# Patient Record
Sex: FEMALE | Race: WHITE | Employment: FULL TIME | URBAN - METROPOLITAN AREA
[De-identification: names, ages, dates, MRNs, and addresses within clinical notes are randomized per-mention and may not be internally consistent; named-entity substitution may affect disease eponyms.]

---

## 2018-01-18 ENCOUNTER — DOCUMENTATION ONLY (OUTPATIENT)
Dept: SURGERY | Facility: CLINIC | Age: 28
End: 2018-01-18

## 2018-03-09 ENCOUNTER — HOSPITAL ENCOUNTER (OUTPATIENT)
Dept: LAB | Facility: CLINIC | Age: 28
Discharge: HOME | End: 2018-03-09
Attending: PHYSICIAN ASSISTANT | Admitting: PHYSICIAN ASSISTANT
Payer: COMMERCIAL

## 2018-03-09 ENCOUNTER — OFFICE VISIT (OUTPATIENT)
Dept: SURGERY | Facility: CLINIC | Age: 28
End: 2018-03-09
Payer: COMMERCIAL

## 2018-03-09 VITALS
SYSTOLIC BLOOD PRESSURE: 140 MMHG | BODY MASS INDEX: 44.41 KG/M2 | WEIGHT: 293 LBS | HEART RATE: 93 BPM | DIASTOLIC BLOOD PRESSURE: 92 MMHG | HEIGHT: 68 IN

## 2018-03-09 VITALS — WEIGHT: 293 LBS | HEIGHT: 68 IN | BODY MASS INDEX: 44.41 KG/M2

## 2018-03-09 DIAGNOSIS — K91.2 POSTSURGICAL MALABSORPTION: ICD-10-CM

## 2018-03-09 DIAGNOSIS — I10 HYPERTENSION, UNSPECIFIED TYPE: ICD-10-CM

## 2018-03-09 DIAGNOSIS — E66.01 MORBID OBESITY WITH BMI OF 45.0-49.9, ADULT (H): ICD-10-CM

## 2018-03-09 DIAGNOSIS — Z98.84 BARIATRIC SURGERY STATUS: Primary | ICD-10-CM

## 2018-03-09 DIAGNOSIS — Z98.84 BARIATRIC SURGERY STATUS: ICD-10-CM

## 2018-03-09 LAB
ERYTHROCYTE [DISTWIDTH] IN BLOOD BY AUTOMATED COUNT: 12.8 % (ref 10–15)
FERRITIN SERPL-MCNC: 5 NG/ML (ref 12–150)
HCT VFR BLD AUTO: 41.7 % (ref 35–47)
HGB BLD-MCNC: 14.4 G/DL (ref 11.7–15.7)
IRON SATN MFR SERPL: 12 % (ref 15–46)
IRON SERPL-MCNC: 61 UG/DL (ref 35–180)
MCH RBC QN AUTO: 29.8 PG (ref 26.5–33)
MCHC RBC AUTO-ENTMCNC: 34.5 G/DL (ref 31.5–36.5)
MCV RBC AUTO: 86 FL (ref 78–100)
PLATELET # BLD AUTO: 300 10E9/L (ref 150–450)
PTH-INTACT SERPL-MCNC: 68 PG/ML (ref 18–80)
RBC # BLD AUTO: 4.83 10E12/L (ref 3.8–5.2)
TIBC SERPL-MCNC: 497 UG/DL (ref 240–430)
VIT B12 SERPL-MCNC: 161 PG/ML (ref 193–986)
WBC # BLD AUTO: 6.4 10E9/L (ref 4–11)

## 2018-03-09 PROCEDURE — 97803 MED NUTRITION INDIV SUBSEQ: CPT | Performed by: DIETITIAN, REGISTERED

## 2018-03-09 PROCEDURE — 82306 VITAMIN D 25 HYDROXY: CPT | Performed by: PHYSICIAN ASSISTANT

## 2018-03-09 PROCEDURE — 82728 ASSAY OF FERRITIN: CPT | Performed by: PHYSICIAN ASSISTANT

## 2018-03-09 PROCEDURE — 83540 ASSAY OF IRON: CPT | Performed by: PHYSICIAN ASSISTANT

## 2018-03-09 PROCEDURE — 83550 IRON BINDING TEST: CPT | Performed by: PHYSICIAN ASSISTANT

## 2018-03-09 PROCEDURE — 99204 OFFICE O/P NEW MOD 45 MIN: CPT | Performed by: PHYSICIAN ASSISTANT

## 2018-03-09 PROCEDURE — 82607 VITAMIN B-12: CPT | Performed by: PHYSICIAN ASSISTANT

## 2018-03-09 PROCEDURE — 85027 COMPLETE CBC AUTOMATED: CPT | Performed by: PHYSICIAN ASSISTANT

## 2018-03-09 PROCEDURE — 36415 COLL VENOUS BLD VENIPUNCTURE: CPT | Performed by: PHYSICIAN ASSISTANT

## 2018-03-09 PROCEDURE — 83970 ASSAY OF PARATHORMONE: CPT | Performed by: PHYSICIAN ASSISTANT

## 2018-03-09 NOTE — MR AVS SNAPSHOT
After Visit Summary   3/9/2018    Siomara Nugent Mohs    MRN: [id number]           Patient Information     Date Of Birth          1990        Visit Information        Provider Department      3/9/2018 11:30 AM Adeel Bose PA-C [facility]      Today's Diagnoses     Bariatric surgery status    -  1    Morbid obesity with BMI of 45.0-49.9, adult (H)        Postsurgical malabsorption        Hypertension, unspecified type           Follow-ups after your visit        Your next 10 appointments already scheduled     Apr 09, 2018  9:30 AM CDT   Return Visit with  Wl Diet 1, RD   [facility] ([facility])    [facility] [address] [id number] [phone]              Future tests that were ordered for you today     Open Future Orders        Priority Expected Expires Ordered    CBC with platelets Routine 3/9/2018 3/9/2019 3/9/2018    Vitamin B12 Routine 3/9/2018 3/9/2019 3/9/2018    Vitamin D Screen Routine 3/9/2018 3/9/2019 3/9/2018    Parathyroid Hormone Intact Routine 3/9/2018 3/9/2019 3/9/2018    Iron Routine 3/9/2018 3/9/2019 3/9/2018    Iron and Iron Binding Capacity Routine 3/9/2018 3/9/2019 3/9/2018    Ferritin Routine 3/9/2018 3/9/2019 3/9/2018            Who to contact     If you have questions or need follow up information about today's clinic visit or your schedule please contact [facility] directly at [phone].  Normal or non-critical lab and imaging results will be communicated to you by MyChart, letter or phone within 4 business days after the clinic has received the results. If you do not hear from us within 7 days, please contact the clinic through MyChart or phone. If you have a critical or abnormal lab result, we will notify you by phone as soon as possible.  Submit refill requests  "through Cubie or call your pharmacy and they will forward the refill request to us. Please allow 3 business days for your refill to be completed.          Additional Information About Your Visit        5 Screens Mediahart Information     Cubie lets you send messages to your doctor, view your test results, renew your prescriptions, schedule appointments and more. To sign up, go to [email or web address] . Click on \"Log in\" on the left side of the screen, which will take you to the Welcome page. Then click on \"Sign up Now\" on the right side of the page.     You will be asked to enter the access code listed below, as well as some personal information. Please follow the directions to create your username and password.     Your access code is: [email or web address]  Expires: 2018 11:29 AM     Your access code will  in 90 days. If you need help or a new code, please call your [facility] clinic or [phone].        Care EveryWhere ID     This is your Care EveryWhere ID. This could be used by other organizations to access your [facility] medical records  [id number]        Your Vitals Were     Pulse Height BMI (Body Mass Index)             93 5' 8\" (1.727 m) 48.1 kg/m2          Blood Pressure from Last 3 Encounters:   18 (!) 140/92    Weight from Last 3 Encounters:   18 (!) 316 lb 6 oz (143.5 kg)   18 (!) 316 lb 9.6 oz (143.6 kg)               Primary Care Provider Office Phone # Fax #    Mary Schaffer [phone]       [facility] [address] [place] [id number]        Equal Access to Services     KAVITA NUNN : Haddilia Avery, la stoddard, jennifer gamble. So [facility] [phone].    ATENCIÓN: Si habla español, tiene a wilburn disposición servicios gratuitos de asistencia lingüística. Llame al [phone].    We comply with applicable federal civil rights laws and [place] laws. We do not discriminate on the " basis of race, color, national origin, age, disability, sex, sexual orientation, or gender identity.            Thank you!     Thank you for choosing [facility]  for your care. Our goal is always to provide you with excellent care. Hearing back from our patients is one way we can continue to improve our services. Please take a few minutes to complete the written survey that you may receive in the mail after your visit with us. Thank you!             Your Updated Medication List - Protect others around you: Learn how to safely use, store and throw away your medicines at www.disposemymeds.org.          This list is accurate as of 3/9/18  2:12 PM.  Always use your most recent med list.                   Brand Name Dispense Instructions for use Diagnosis    AMBIEN PO      Take 5 mg by mouth        B-12 PO      Take 1,000 mcg by mouth.        vitamin D 1000 UNITS capsule      Take 1 capsule by mouth daily.

## 2018-03-09 NOTE — PROGRESS NOTES
"NUTRITION POST OP APPOINTMENT  DATE OF VISIT: March 9, 2018    Angie Lynn Mohs  1990  female  [id number]  27 year old     ASSESSMENT:    REASON FOR VISIT:  Siomara is a 27 year old year old female presents today for 9 years PO nutrition follow-up appointment. Patient is accompanied by son.    DIAGNOSIS:  Status post gastric bypass surgery.  Obesity Obesity Grade III BMI >40     ANTHROPOMETRICS:  Initial Weight: 376.5 lbs  Height: 172.7 cm (5' 8\")  Current Weight: (!) 143.6 kg (316 lb 9.6 oz)   BMI: 48.24 kg/(m^2).    VITAMINS AND MINERALS:  Not taking any supplements; took standard post-op supplements for 1.5 years following surgery. Then took a prenatal supplement during pregnancies, none since last child born 7 months ago     NUTRITION HISTORY:  Breakfast: 2 eggs + 2 pieces of toast w/butter  Lunch: sandwich (turkey, oliva, bread), carrots, salads, homemade \"lunchables\" containing cheese, ham, crackers, grapes, carrots   Supper: lasagna and salad and fruit OR sloppy joes OR sausage tortellini OR meatloaf OR porkchops. Sides usually include a vegetable, fruit and starch   Snacks: evenings - popcorn   Restaurants: 2x/week, stir-renner   Fast food: 2x/month   Fluids consumed: 2-3 cups of coffee, water, soda 3x/week,   Consuming liquid calories: Yes  Protein intake: 50-70 grams/day  Tolerate regular texture food: Yes  Any foods not tolerated details: Yes  If any food not tolerated: ice cream, candy/candy bars, dairy, high sugar items   Portion size: 1 cup or more  Take 20-30 minutes to consume each meal: No   Eat protein foods first: No  Fluids and meals separate by at least 30 minutes: No  Chew foods thoroughly: No  Tolerating diet: Yes  Drinking high protein supplements: No  Consuming snacks per day: 1  Additional Information: Pt s/p gastric bypass (2009 w/LEL). Not seen in clinic since 2011. Seeking revision to be healthier for her children. Reviewed general guidelines following bariatric surgery; provided pt with " "new copies of dietary handbook, stage 5 meal plan and bariatric grocery list. Spent significant amount of time focusing on the importance of lifestyle changes for long-term success, surgery as a tool. Repeatedly enforced that surgery will not \"work\" if guidelines arent followed, as pt does not appear to make strong connections between lifestyle and failure of previous surgery. Sometimes difficult to redirect towards difficult topics; often uses humor to deflect more serious conversation. Recommended pt return for f/u in 1 month, regardless of whether or not pursuing revision.         PHYSICAL ACTIVITY:  Type: walks, [employer] membership (walking track or swimming)   Frequency (days per week): 4  Duration (min): 30    DIAGNOSIS:  Previous Nutrition Diagnosis: Altered gastrointestinal function related to alteration in gastrointestinal structure as evidenced by history of gastric bypass surgery.- no change    Previous goals:  None/remote     Current Nutrition Diagnosis: Altered gastrointestinal function related to alteration in gastrointestinal structure as evidenced by history of gastric bypass surgery.    INTERVENTION:   Nutrition Prescription: Eat 3 meals a day at regular intervals. Consume 60-90 grams of protein daily. Follow post-surgical vitamin and mineral protocol.  Assessed learning needs and learning preferences.    GOALS:  Switch to decaf coffee  Replace solid snacks in the evening with milk or protein drinks  Separate fluids and meals by 30 minutes  Aim for 7184-9722 kcals/day  (Not written but discussed) Start all pre-op vitamin/mineral supplements.     Follow-Up:   Recommend monthly  follow up visits to assist with lifestyle changes or per insurance.  Implementation: Discussed progress toward previous goals; reinforced importance of following bariatric lifestyle changes.    NUTRITION MONITORING AND EVALUATION:  Anticipated compliance: fair  Verbalized fair understanding.    Follow up: Patient to follow up in 1 " month.    TIME SPENT WITH PATIENT:  40 minutes    Sisi Rascon RD, LD  Clinical Dietitian

## 2018-03-09 NOTE — MR AVS SNAPSHOT
"                  MRN:[id number]                      After Visit Summary   3/9/2018    Angie Lynn Mohs    MRN: [id number]           Visit Information        Provider Department      3/9/2018 10:30 AM Kurt Avila RD [facility]      Your next 10 appointments already scheduled     Mar 09, 2018 11:30 AM CST   Annual Visit with Adeel Bose PA-C   [facility] ([facility])    [address] [place] [id number] [phone]              MyChart Information     Third Screen Mediahart lets you send messages to your doctor, view your test results, renew your prescriptions, schedule appointments and more. To sign up, go to [email or web address] . Click on \"Log in\" on the left side of the screen, which will take you to the Welcome page. Then click on \"Sign up Now\" on the right side of the page.     You will be asked to enter the access code listed below, as well as some personal information. Please follow the directions to create your username and password.     Your access code is: [email or web address]  Expires: 2018 11:29 AM     Your access code will  in 90 days. If you need help or a new code, please call your [facility] clinic or [phone].        Care EveryWhere ID     This is your Care EveryWhere ID. This could be used by other organizations to access your [facility] medical records  [id number]        Equal Access to Services     JAXSON NUNN : Hadii aad ku hadasho Soomaali, waaxda luqadaha, qaybta kaalmada adeegyada, jennifer arevalo. So [facility] [phone].    ATENCIÓN: Si habla español, tiene a wilburn disposición servicios gratuitos de asistencia lingüística. Llame al [phone].    We comply with applicable federal civil rights laws and [place] laws. We do not discriminate on the basis of race, color, national origin, age, disability, " sex, sexual orientation, or gender identity.
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
## 2018-03-12 LAB — DEPRECATED CALCIDIOL+CALCIFEROL SERPL-MC: 34 UG/L (ref 20–75)

## 2021-03-26 ENCOUNTER — VIRTUAL VISIT (OUTPATIENT)
Dept: SURGERY | Facility: CLINIC | Age: 31
End: 2021-03-26
Payer: COMMERCIAL

## 2021-03-26 ENCOUNTER — DOCUMENTATION ONLY (OUTPATIENT)
Dept: SURGERY | Facility: CLINIC | Age: 31
End: 2021-03-26

## 2021-03-26 VITALS — WEIGHT: 293 LBS | BODY MASS INDEX: 44.41 KG/M2 | HEIGHT: 68 IN

## 2021-03-26 VITALS — BODY MASS INDEX: 44.41 KG/M2 | HEIGHT: 68 IN | WEIGHT: 293 LBS

## 2021-03-26 DIAGNOSIS — E66.813 CLASS 3 SEVERE OBESITY DUE TO EXCESS CALORIES WITH SERIOUS COMORBIDITY AND BODY MASS INDEX (BMI) OF 50.0 TO 59.9 IN ADULT (H): Primary | ICD-10-CM

## 2021-03-26 DIAGNOSIS — K91.2 POSTSURGICAL MALABSORPTION: ICD-10-CM

## 2021-03-26 DIAGNOSIS — E66.01 CLASS 3 SEVERE OBESITY DUE TO EXCESS CALORIES WITH SERIOUS COMORBIDITY AND BODY MASS INDEX (BMI) OF 50.0 TO 59.9 IN ADULT (H): Primary | ICD-10-CM

## 2021-03-26 DIAGNOSIS — Z98.84 BARIATRIC SURGERY STATUS: ICD-10-CM

## 2021-03-26 DIAGNOSIS — E66.01 CLASS 3 SEVERE OBESITY DUE TO EXCESS CALORIES WITH SERIOUS COMORBIDITY AND BODY MASS INDEX (BMI) OF 50.0 TO 59.9 IN ADULT (H): ICD-10-CM

## 2021-03-26 DIAGNOSIS — K21.9 GERD WITHOUT ESOPHAGITIS: ICD-10-CM

## 2021-03-26 DIAGNOSIS — E66.01 MORBID OBESITY WITH BMI OF 45.0-49.9, ADULT (H): ICD-10-CM

## 2021-03-26 DIAGNOSIS — E66.813 CLASS 3 SEVERE OBESITY DUE TO EXCESS CALORIES WITH SERIOUS COMORBIDITY AND BODY MASS INDEX (BMI) OF 50.0 TO 59.9 IN ADULT (H): ICD-10-CM

## 2021-03-26 PROCEDURE — 97803 MED NUTRITION INDIV SUBSEQ: CPT | Mod: 95 | Performed by: DIETITIAN, REGISTERED

## 2021-03-26 PROCEDURE — 99203 OFFICE O/P NEW LOW 30 MIN: CPT | Mod: 95 | Performed by: PHYSICIAN ASSISTANT

## 2021-03-26 RX ORDER — SERTRALINE HYDROCHLORIDE 100 MG/1
100 TABLET, FILM COATED ORAL DAILY
COMMUNITY
Start: 2021-03-02

## 2021-03-26 RX ORDER — BUSPIRONE HYDROCHLORIDE 10 MG/1
10 TABLET ORAL
COMMUNITY
Start: 2021-03-01 | End: 2022-03-01

## 2021-03-26 ASSESSMENT — MIFFLIN-ST. JEOR
SCORE: 2323.41
SCORE: 2323.41

## 2021-03-26 NOTE — PATIENT INSTRUCTIONS
It was good seeing you!        ASSESSMENT AND PLAN:       Class 3 severe obesity due to excess calories with serious comorbidity and body mass index (BMI) of 50.0 to 59.9 in adult (H)     Bariatric surgery status              11 years S/P Gastric Bypass     Postsurgical malabsorption  -     Vitamin A (Bypass/Sleeve); Future  -     CBC with platelets; Future  -     Vitamin B12; Future  -     Vitamin D Screen; Future  -     Parathyroid Hormone Intact; Future  -     Iron; Future  -     Iron and Iron Binding Capacity; Future  -     Ferritin; Future  -     Vitamin B1 whole blood; Future     GERD without esophagitis              Patient to continue with omeprazole.  Discussed stopping/decreasing NSAID and caffeine use due to irritation along gastric staple line.  Let her know this could be contributing to her stomach issues.      Return in 2 months to talk with provider and diet.         Tips for Weight Loss Surgery Success    1. Eat only 3 small meals a day  2. Eat slowly and chew thoroughly  3. Stop eating as soon as you feel full  4. Do not drink while eating  5. Do not eat between meals  6. Eat only good quality food  7. Drink enough fluids during the day  8. Drink only low-calorie liquids  9. Exercise at least 30 minutes a day

## 2021-03-26 NOTE — PROGRESS NOTES
Mailed per RD request:  AVS's from today's visits  Stage 5 diet materials  Diet Guidelines After WLS  Vitamin supplement handout.  Carmen Muñoz MS, RD, RN

## 2021-03-26 NOTE — PROGRESS NOTES
Siomara is a 30 year old who is being evaluated via a billable video visit.      If the video visit is dropped, the invitation should be resent by: Text to cell phone: [phone]  Will anyone else be joining your video visit? No      Video-Visit Details    Type of service:  Video Visit    Video Start Time: 12:39    Video End Time: 1:01    29 minutes spent on the date of the encounter doing chart review, review of test results, patient visit and documentation       Originating Location (pt. Location): Home    Distant Location (provider location):  [facility]     Platform used for Video Visit: [employer]           VIRTUAL BARIATRIC FOLLOW UP          March 26, 2021      HISTORY OF PRESENT ILLNESS: Pt presents today for her follow-up appointment status post laparoscopic gastric bypass. Her last visit was 3 years ago which was her first visit since having surgery in 2009. Feels like she has gained all of her weight back. She wishes she was older when she had the surgery.  Asks about a revision today just as she had in 2018.  Also reports worsening GERD.  Is taking omeprazole 20 mg daily      345 lbs 0 oz    Wt Readings from Last 4 Encounters:   03/26/21 (!) 345 lb (156.5 kg)   03/09/18 (!) 316 lb 6 oz (143.5 kg)   03/09/18 (!) 316 lb 9.6 oz (143.6 kg)      BARIATRIC METRICS:  Current Weight: (!) 345 lb (156.5 kg)(Patient states)  Body mass index is 52.46 kg/m .   Wt change since last visit (lbs): 29  Cumulative weight loss (lbs): 31       Patient is taking the following bariatric postoperative vitamins:  No vitamins for about a year    Pt is exercising by trying to walk 30 minutes daily for the past 6 months.        OBESITY RELATED CONDITIONS:  Hypertension - Not currently on meds.    GERD - Having very severe for the past year . Takes omeprazole 20 mg daily but has weekly breakthrougs  PCOS  Depression - Not feeling depressed. On medications and is doing well        SOCIAL  "HISTORY:  Pt denies smoking.  Pt denies alcohol use.  Takes 400 mg ibuprofen about 10 times monthly.  Drinks 2-3 cups of regular coffee      REVIEW OF SYSTEMS:     GI:  Nausea- No  Vomiting- No  Diarrhea-No  Constipation- No  Dysphagia- No  Abdominal Pain- No  Heartburn- Yes see above     SKIN:  Intertriginous irritation- No  Hair loss - No     PSYCH:  Depression- Under good control  Anxiety- Under good control      LABS/IMAGING/MEDICAL RECORDS REVIEW:   Vitamin D Deficiency screening   Date Value Ref Range Status   03/09/2018 34 20 - 75 ug/L Final     Comment:     Season, race, dietary intake, and treatment affect the concentration of   25-hydroxy-Vitamin D. Values may decrease during winter months and increase   during summer months. Values 20-29 ug/L may indicate Vitamin D insufficiency   and values <20 ug/L may indicate Vitamin D deficiency.  Vitamin D determination is routinely performed by an immunoassay specific for   25 hydroxyvitamin D3.  If an individual is on vitamin D2 (ergocalciferol)   supplementation, please specify 25 OH vitamin D2 and D3 level determination by   LCMSMS test VITD23.       Parathyroid Hormone Intact   Date Value Ref Range Status   03/09/2018 68 18 - 80 pg/mL Final     Vitamin B12   Date Value Ref Range Status   03/09/2018 161 (L) 193 - 986 pg/mL Final     Hemoglobin   Date Value Ref Range Status   03/09/2018 14.4 11.7 - 15.7 g/dL Final     Ferritin   Date Value Ref Range Status   03/09/2018 5 (L) 12 - 150 ng/mL Final     Iron   Date Value Ref Range Status   03/09/2018 61 35 - 180 ug/dL Final     Iron Binding Cap   Date Value Ref Range Status   03/09/2018 497 (H) 240 - 430 ug/dL Final     Iron Saturation Index   Date Value Ref Range Status   03/09/2018 12 (L) 15 - 46 % Final   08/12/2010 44 15 - 46 % Final   01/18/2010 19 15 - 46 % Final       PHYSICAL EXAMINATION:  Ht 5' 8\" (1.727 m)   Wt (!) 345 lb (156.5 kg)   BMI 52.46 kg/m      GENERAL: Pt sounds in NAD.   NEURO:  Mentation and " speech appropriate for age. AxO x3.  PSYCH: affect normal judgement and insight intact    ASSESSMENT AND PLAN:      Class 3 severe obesity due to excess calories with serious comorbidity and body mass index (BMI) of 50.0 to 59.9 in adult (H)    Bariatric surgery status   11 years S/P Gastric Bypass    Postsurgical malabsorption  -     Vitamin A (Bypass/Sleeve); Future  -     CBC with platelets; Future  -     Vitamin B12; Future  -     Vitamin D Screen; Future  -     Parathyroid Hormone Intact; Future  -     Iron; Future  -     Iron and Iron Binding Capacity; Future  -     Ferritin; Future  -     Vitamin B1 whole blood; Future    GERD without esophagitis   Patient to continue with omeprazole.  Discussed stopping/decreasing NSAID and caffeine use due to irritation along gastric staple line.  Let her know this could be contributing to her stomach issues.      Will have her return in 2 months after she works on lifestyle to possibly discuss medications to aid in weight loss.       Adeel Bose MS, PA-C

## 2021-03-26 NOTE — PATIENT INSTRUCTIONS
"David Stringer!      Diet Guidelines after Weight-loss Surgery  [email or web address]       Supplements after Weight Loss Surgery  [email or web address]        Here's a summary of the goals we set today:      1. Eat 3 meals each day. Have breakfast within 1 hour of waking up, then eat a meal every 4-6 hours.      2. Replace \"solid\" snacks with low-fat milk or a protein drink. Lactose-free milks would work best: Lactaid or Fair Life are great options. As for protein drinks, make sure your product meets the following criteria: <250 calories, 15-30g protein, <10g sugar, <10g fat.      3. Separate fluids and meals by 30 minutes    4. Begin taking the following supplements: multivitamin, calcium, vitamin D, Vitamin B Complex, Vitamin B12, Iron. Dosages can be found in the attached chart. We'll streamline everything at our next follow-up visit.         Let's plan on following up in 1-2 months. This can be scheduled via the call center at . Let me know if you have any questions/concerns in the meantime!      Sisi Rascon RD, LD  Clinical Dietitian           "

## 2021-03-26 NOTE — PROGRESS NOTES
"Siomara is a 30 year old who is being evaluated via a billable video visit.      How would you like to obtain your AVS? Mail  If the video visit is dropped, the invitation should be resent by: Text to cell phone: [phone]  Will anyone else be joining your video visit? No      Video Start Time: 1:06pm      Video-Visit Details    Type of service:  Video Visit    Video End Time: 1:31pm    Originating Location (pt. Location): Home    Distant Location (provider location): Provider Remote Setting    Platform used for Video Visit: [facility]     NUTRITION POST OP APPOINTMENT  DATE OF VISIT: March 26, 2021    Siomara Nugent Mohs  1990  female  [id number]  30 year old     ASSESSMENT:    REASON FOR VISIT:  Siomara is a 30 year old year old female presents today for ~12 year PO nutrition follow-up appointment. Patient is accompanied by self.    DIAGNOSIS:  Status post gastric bypass surgery.  Obesity Obesity Grade III BMI >40     ANTHROPOMETRICS:  Initial Weight: 376.5 lbs    Height: 5' 8\"   Current Weight: 345 lbs 0 oz    BMI: Body mass index is 52.46 kg/m .    VITAMINS AND MINERALS:  None    NUTRITION HISTORY:  Breakfast: (skips)  Lunch: low-rebeka frozen meal  Supper: spaghetti  chili  lasagna   Snacks: cheese, fruits, vegetables, sandwich, chips, popcorn   Fluids consumed: Water (40oz), [employer] Cooler (daily), coffee (3-4 cups, caffeinated)  Consuming liquid calories: Yes  Protein intake: 50-70 grams/day  Tolerate regular texture food: Yes  Any foods not tolerated details: Yes  If any food not tolerated: starchy foods, lactose  Portion size: 1 cup or more  Take 20-30 minutes to consume each meal: No   Eat protein foods first: No  Fluids and meals separate by at least 30 minutes: No  Chew foods thoroughly: No  Tolerating diet: Yes  Drinking high protein supplements: No  Consuming snacks per day: several/grazing  Additional Information: Continues to be non-compliant with post-op dietary recommendations. Reviewed all " guidelines. Stressed the importance of lifelong vitamin/mineral supplementation. Recommended q1-2m visits until feeling confident in behavior changes.         PHYSICAL ACTIVITY:  Type: walking  Frequency (days per week): 3-4  Duration (min): 30    DIAGNOSIS:  Previous Nutrition Diagnosis: Altered gastrointestinal function related to alteration in gastrointestinal structure as evidenced by history of gastric bypass surgery.- no change    Previous goals:  (remote)    Current Nutrition Diagnosis: Altered gastrointestinal function related to alteration in gastrointestinal structure as evidenced by history of gastric bypass surgery.    INTERVENTION:   Nutrition Prescription: Eat 3 meals a day at regular intervals. Consume 60-90 grams of protein daily. Follow post-surgical vitamin and mineral protocol.  Assessed learning needs and learning preferences.    GOALS:  Eat 3 meals/day  Replace snacks with low-fat milk   Separate fluids and meals by 30 minutes      Follow-Up:   Recommend q1-2m follow up visits to assist with lifestyle changes or per insurance.  Implementation: Discussed progress toward previous goals; reinforced importance of following bariatric lifestyle changes.    NUTRITION MONITORING AND EVALUATION:  Anticipated compliance: fair-poor  Verbalized fair understanding.    Follow up: Patient to follow up in 1-2 months.    TIME SPENT WITH PATIENT:  25 minutes      Sisi Rascon RD, LD  Clinical Dietitian